# Patient Record
Sex: MALE | URBAN - METROPOLITAN AREA
[De-identification: names, ages, dates, MRNs, and addresses within clinical notes are randomized per-mention and may not be internally consistent; named-entity substitution may affect disease eponyms.]

---

## 2024-07-30 ENCOUNTER — HOSPITAL ENCOUNTER (OUTPATIENT)
Dept: TELEMEDICINE | Facility: OTHER | Age: 24
Discharge: HOME | End: 2024-07-30

## 2024-07-30 PROCEDURE — G0426 INPT/ED TELECONSULT50: HCPCS | Mod: 95,,, | Performed by: STUDENT IN AN ORGANIZED HEALTH CARE EDUCATION/TRAINING PROGRAM

## 2024-07-30 NOTE — CONSULTS
"[facility]  Psychiatry  Telepsychiatry Consult Note    Please see previous notes:    Patient agreeable to consultation via telepsychiatry.    Tele-Consultation from Psychiatry started: 7/30/2024 at 3:50 pm  The chief complaint leading to psychiatric consultation is: paranoia  This consultation was requested by , the Emergency Department attending physician.  The location of the consulting psychiatrist is  [place] .  The patient location is [facility] ED [facility]   The patient arrived at the ED at: 14:00    Also present with the patient at the time of the consultation: Mother    Patient Identification:   Miguelito Paniagua is a 23 y.o. male.    Patient information was obtained from patient, parent, and primary team.  Patient presented voluntarily to the Emergency Department by private vehicle.    Consults  Consult Start Time: 07/30/2024 15:47 CDT  Consult End Time: 07/30/2024 16:45 CDT        Subjective:     History of Present Illness:  Mr. Paniagua is a 22yo man w hx of schizophrenia, 1 previous psychiatric admission, currently medication non-adherent, who presents with his mother due to recent worsening symptoms of paranoia and AH.    Upon approach, patient tells me "I felt like I did a year ago, when I was hospitalized. I know what is going on, but I don't know where to go from here." Mr. Paniagua reports, "I had a panic attack that someone was trying to get me last night. And then we took the ambulance here." The patient has been traveling for work recently and lives in [place]. Last week, he was in [place], where he "started feeling like everything was not real" and "like my mind is playing tricks on me...Something in my mind was in fight or flight mode." Sleep has been diminished.    He reports a persistent paranoid feeling that someone is after him and his mother. He endorses fear that he won't be able to control himself if he leaves the ER right now -- " "an overwhelming fear of "dying, maybe I will get shot... it's just a gut conviction." Reports recent AH. Denies SI. Denies symptoms of depression. Denies substance use, including marijuana or psychedelics.    Patient states he is planning "to get baptized next week," and "hoping it will fix things." When asked what "things" he means, patient replies "like hearing and seeing things... I am seeing spiritual, funky stuff."    Patient is not currently taking medications, nor is he engaged in close, outpatient mental healthcare. Per patient, "I felt like I could do it all on my own," so he stopped taking medications and has not followed-up in 6-8 months.      Psychiatric History:   Previous Psychiatric Hospitalizations: Yes - 1x in 2023 at [facility] (about 15 day admission)  Previous Medication Trials: Yes   Previous Suicide Attempts: no  History of Violence: No  History of Depression: No  History of Tonja: No  History of Auditory/Visual Hallucination Yes  History of Delusions: Yes  Outpatient psychiatrist (current & past): No    Substance Abuse History:  Tobacco:No  Alcohol: Yes  Illicit Substances:No  Detox/Rehab: No    Legal History: Past charges/incarcerations: No     Family Psychiatric History:  Mother - Depression/Anxiety    Social History:  Developmental/Childhood:Achieved all developmental milestones timely  *Education:Bachelor's Degree  Employment Status/Finances:Employed - Just graduated college and started a job in [place]. 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  Housing Status: Home with brother and brother's girlfriend   history:  NO  Access to gun: NO  Recreational activities: exercises, reads the bible, attends [employer]    Psychiatric Mental Status Exam:  Arousal: alert  Sensorium/Orientation: oriented to grossly intact  Behavior/Cooperation: cooperative, oddly-related  Speech: normal tone, normal rate, normal pitch, normal volume  Language: grossly " "intact  Mood: "off "   Affect: constricted  Thought Process: mostly linear and organized  Thought Content: some hyper-Congregational and paranoid content  Auditory hallucinations: YES:     Visual hallucinations: NO  Paranoia: YES:     Delusions:  YES: "I'm getting baptized next week to fix things"     Suicidal ideation: NO  Homicidal ideation: NO  Attention/Concentration:  intact  Memory:    Recent:  Intact  Insight: has awareness of illness  Judgment: Limited      Past Medical History: No past medical history on file.   Laboratory Data: Labs Reviewed - No data to display      Allergies:   Review of patient's allergies indicates:  Not on File    Medications in ER: Medications - No data to display    Medications at home: None    No new subjective & objective note has been filed under this hospital service since the last note was generated.      Assessment - Diagnosis - Goals:     Diagnosis/Impression:  Schizophrenia, Decompensated    Mr. Paniagua is a 24 yo man w hx of schizophrenia, 1 previous psychiatric admission, currently medication non-adherent, who self-presents with mother due to recent worsening symptoms of paranoia and AH during a work trip. Persistent paranoia regarding people following him and his mother, feeling as though "things are not real," and insomnia. On exam, he is somewhat oddly-related and spontaneously discloses hyper-Congregational ideas. Patient relates his recent symptomatology to his psychotic break last year. He is help-seeking, motivated, and willing to voluntarily seek inpatient psychiatric treatment.    Rec:  ~Seek inpatient psychiatric treatment  ~Start Aripiprazole 5mg daily    Time with patient: 50 minutes      More than 50% of the time was spent counseling/coordinating care    Consulting clinician was informed of the encounter and consult note.    Consultation ended: 7/30/2024 at 5:10pm    Bruce Little MD   Psychiatry  [facility]    "